# Patient Record
Sex: FEMALE | Race: WHITE | Employment: FULL TIME | URBAN - METROPOLITAN AREA
[De-identification: names, ages, dates, MRNs, and addresses within clinical notes are randomized per-mention and may not be internally consistent; named-entity substitution may affect disease eponyms.]

---

## 2017-01-11 ENCOUNTER — TELEPHONE (OUTPATIENT)
Dept: FAMILY MEDICINE | Facility: CLINIC | Age: 32
End: 2017-01-11

## 2017-01-11 DIAGNOSIS — J45.20 MILD INTERMITTENT ASTHMA WITHOUT COMPLICATION: Primary | ICD-10-CM

## 2017-01-11 NOTE — TELEPHONE ENCOUNTER
Reason for Call:  Medication or medication refill:    Do you use a [facility]?  Name of the pharmacy and phone number for the current request:  [id number] [place] [address] [phone] (Phone)    Name of the medication requested: Alternative to Symbicort    Other request: [place] with   [id number] [place] [phone] (Phone)  [phone] (Fax)           Calling to report that the patient has changed insurance and that the Symbicort would now require a PA.  He stated that the alternatives that would be covered would be:  Breo Ellipta, Advair, or Dulera    Can we leave a detailed message on this number? YES    Phone number patient can be reached at: Other phone number:    [id number] [place] [address] [phone] (Phone)  [phone] (Fax)               Best Time: When Prescription approved    Call taken on 1/11/2017 at 3:21 PM by GUSTAVO TRUJILLO

## 2017-06-23 DIAGNOSIS — J45.20 MILD INTERMITTENT ASTHMA WITHOUT COMPLICATION: ICD-10-CM

## 2017-06-26 RX ORDER — ALBUTEROL SULFATE 90 UG/1
AEROSOL, METERED RESPIRATORY (INHALATION)
Qty: 8.5 INHALER | Refills: 0 | Status: SHIPPED | OUTPATIENT
Start: 2017-06-26 | End: 2017-08-24

## 2017-06-26 NOTE — TELEPHONE ENCOUNTER
albuterol inhaler     Routing refill request to provider for review/approval because:  Patient way overdue for asthma check OV        Last Written Prescription Date: 8-1-16  Last Fill Quantity: 2inh, # refills: 3    Last Office Visit with G, TAMIA or [facility] prescribing provider:  7-11-16   Future Office Visit:       Date of Last Asthma Action Plan Letter:   Asthma Action Plan Q1 Year    Topic Date Due     Asthma Action Plan - yearly  07/09/2017      Asthma Control Test:   ACT Total Scores 6/11/2016   ACT TOTAL SCORE -   ASTHMA ER VISITS -   ASTHMA HOSPITALIZATIONS -   ACT TOTAL SCORE (Goal Greater than or Equal to 20) 22   In the past 12 months, how many times did you visit the emergency room for your asthma without being admitted to the hospital? 2   In the past 12 months, how many times were you hospitalized overnight because of your asthma? 0       Date of Last Spirometry Test:   No results found for this or any previous visit.

## 2017-06-26 NOTE — TELEPHONE ENCOUNTER
Overdue for office visit, only 1 month supply submitted. Signed and in my outbox.    No additional refills until seen in clinic.     Please call and inform pt to schedule an office visit

## 2017-07-07 DIAGNOSIS — J45.20 MILD INTERMITTENT ASTHMA WITHOUT COMPLICATION: ICD-10-CM

## 2017-07-10 NOTE — TELEPHONE ENCOUNTER
Breo ellipta 100-*25 mcg inhaler       Last Written Prescription Date: 1/11/17  Last Fill Quantity: 1 inhaler, # refills: 5    Last Office Visit with [facility], P or [facility] prescribing provider:  7/11/16   Future Office Visit:       Date of Last Asthma Action Plan Letter:   Asthma Action Plan Q1 Year    Topic Date Due     Asthma Action Plan - yearly  07/09/2017      Asthma Control Test:   ACT Total Scores 6/11/2016   ACT TOTAL SCORE -   ASTHMA ER VISITS -   ASTHMA HOSPITALIZATIONS -   ACT TOTAL SCORE (Goal Greater than or Equal to 20) 22   In the past 12 months, how many times did you visit the emergency room for your asthma without being admitted to the hospital? 2   In the past 12 months, how many times were you hospitalized overnight because of your asthma? 0       Date of Last Spirometry Test:   No results found for this or any previous visit.

## 2018-02-26 ENCOUNTER — HOSPITAL ENCOUNTER (EMERGENCY)
Facility: CLINIC | Age: 33
Discharge: HOME | End: 2018-02-26
Attending: EMERGENCY MEDICINE | Admitting: EMERGENCY MEDICINE
Payer: COMMERCIAL

## 2018-02-26 VITALS
WEIGHT: 160 LBS | RESPIRATION RATE: 22 BRPM | SYSTOLIC BLOOD PRESSURE: 120 MMHG | DIASTOLIC BLOOD PRESSURE: 68 MMHG | OXYGEN SATURATION: 99 % | TEMPERATURE: 97.6 F | BODY MASS INDEX: 27.31 KG/M2 | HEART RATE: 106 BPM | HEIGHT: 64 IN

## 2018-02-26 DIAGNOSIS — J45.901 MODERATE ASTHMA WITH EXACERBATION, UNSPECIFIED WHETHER PERSISTENT: ICD-10-CM

## 2018-02-26 PROCEDURE — 25000125 ZZHC RX 250: Performed by: EMERGENCY MEDICINE

## 2018-02-26 PROCEDURE — 99284 EMERGENCY DEPT VISIT MOD MDM: CPT

## 2018-02-26 PROCEDURE — 94640 AIRWAY INHALATION TREATMENT: CPT

## 2018-02-26 RX ORDER — IPRATROPIUM BROMIDE AND ALBUTEROL SULFATE 2.5; .5 MG/3ML; MG/3ML
3 SOLUTION RESPIRATORY (INHALATION) ONCE
Status: COMPLETED | OUTPATIENT
Start: 2018-02-26 | End: 2018-02-26

## 2018-02-26 RX ORDER — ALBUTEROL SULFATE 0.83 MG/ML
1 SOLUTION RESPIRATORY (INHALATION) EVERY 6 HOURS PRN
Qty: 25 ML | Refills: 0 | Status: SHIPPED | OUTPATIENT
Start: 2018-02-26

## 2018-02-26 RX ORDER — PREDNISONE 20 MG/1
60 TABLET ORAL ONCE
Status: COMPLETED | OUTPATIENT
Start: 2018-02-26 | End: 2018-02-26

## 2018-02-26 RX ORDER — PREDNISONE 20 MG/1
TABLET ORAL
Qty: 10 TABLET | Refills: 0 | Status: SHIPPED | OUTPATIENT
Start: 2018-02-26

## 2018-02-26 RX ADMIN — PREDNISONE 60 MG: 20 TABLET ORAL at 21:30

## 2018-02-26 RX ADMIN — IPRATROPIUM BROMIDE AND ALBUTEROL SULFATE 3 ML: .5; 3 SOLUTION RESPIRATORY (INHALATION) at 21:30

## 2018-02-26 RX ADMIN — IPRATROPIUM BROMIDE AND ALBUTEROL SULFATE 3 ML: .5; 3 SOLUTION RESPIRATORY (INHALATION) at 22:26

## 2018-02-26 ASSESSMENT — ENCOUNTER SYMPTOMS
WHEEZING: 1
SHORTNESS OF BREATH: 1
CHEST TIGHTNESS: 1

## 2018-02-26 NOTE — ED AVS SNAPSHOT
Emergency Department    [address] [facility] [address]    Phone:  [phone]    Fax:  [phone]                                       Ella Elkins   MRN: [id number]    Department:   Emergency Department   Date of Visit:  2/26/2018           Patient Information     Date Of Birth          1985        Your diagnoses for this visit were:     Moderate asthma with exacerbation, unspecified whether persistent        You were seen by Lee Foley MD.      Follow-up Information     Follow up with Sofi Aragon PA-C In 2 days.    Specialty:  Physician Assistant    Contact information:    [address]  [facility] [address] [phone]          Follow up with  Emergency Department.    Specialty:  EMERGENCY MEDICINE    Why:  As needed    Contact information:    [address] [facility] [address] [phone]        Discharge Instructions       Discharge Instructions  Asthma    Asthma is a condition causing narrowing and inflammation of the airways that can make it hard to breathe.  Asthma can also cause cough, wheezing, noisy breathing and tightness in the chest.  Asthma can be brought on or  triggered  by many things, including dust, mold, pollen, cigarette smoke, exercise, stress and infections (like the common cold).     Generally, every Emergency Department visit should have a follow-up clinic visit with either a primary or a specialty clinic/provider. Please follow-up as instructed by your emergency provider today.    Return to the Emergency Department if:    Your breathing gets worse.    You need to use your inhaler more often than every 4 hours, or cannot get relief from your inhaler.    You are very weak, or feel much more ill.    You develop new symptoms, such as chest pain.    You cough up blood.    You are vomiting (throwing up) enough that you cannot keep fluids or your medicine down.    What can I do to help  myself?    Fill any prescriptions the provider gave you and take them right away--especially antibiotics. Be sure to finish the whole antibiotic prescription.    You may be given a prescription for an inhaler, which can help loosen tight air passages.  Use this as needed, but not more often than directed. Inhalers work much better when used with a spacer.     You may be given a prescription for a steroid to reduce inflammation. Used long-term, these can have some side effects, but for short-term use they are safe. You may notice restlessness or increased appetite (eating more).        You may use non-prescription cough or cold medicines. Cough medicines may help, but do not make the cough go away completely.     Avoid smoke, because this can make you feel worse. If you smoke, this may be a good time to quit! Consider using nicotine lozenges, gum, or patches to reduce cravings.     If you have a fever, Tylenol  (acetaminophen), Motrin  (ibuprofen), or Advil  (ibuprofen), may help bring fever down and may help you feel more comfortable. Be sure to read and follow the package directions, and ask your provider if you have questions.    Be sure to get your flu shot each year.  For certain age groups, the pneumonia shot can help prevent pneumonia.  It is important that you follow up with your regular provider, to be sure that you are improving from this spell (an acute asthma exacerbation), and also to do what you can to keep from having trouble again. Sometimes you need long-term medicines to keep your asthma under control.   If you were given a prescription for medicine here today, be sure to read all of the information (including the package insert) that comes with your prescription.  This will include important information about the medicine, its side effects, and any warnings that you need to know about.  The pharmacist who fills the prescription can provide more information and answer questions you may have about  the medicine.  If you have questions or concerns that the pharmacist cannot address, please call or return to the Emergency Department.   Remember that you can always come back to the Emergency Department if you are not able to see your regular provider in the amount of time listed above, if you get any new symptoms, or if there is anything that worries you.     24 Hour Appointment Hotline       To make an appointment at any [facility], call [phone] ([phone]). If you don't have a family doctor or clinic, we will help you find one. [facility] are conveniently located to serve the needs of you and your family.             Review of your medicines      START taking        Dose / Directions Last dose taken    predniSONE 20 MG tablet   Commonly known as:  DELTASONE   Quantity:  10 tablet        Take two tablets (= 40mg) each day for 5 (five) days   Refills:  0          Our records show that you are taking the medicines listed below. If these are incorrect, please call your family doctor or clinic.        Dose / Directions Last dose taken    * albuterol 108 (90 BASE) MCG/ACT Inhaler   Commonly known as:  PROAIR HFA/PROVENTIL HFA/VENTOLIN HFA   Dose:  2 puff   Quantity:  2 Inhaler        Inhale 2 puffs into the lungs every 6 hours as needed for shortness of breath / dyspnea MD APPOINTMENT NEEDED   Refills:  3        * PROAIR  (90 BASE) MCG/ACT Inhaler   Quantity:  8.5 Inhaler   Generic drug:  albuterol        INHALE 2 PUFFS BY MOUTH EVERY 6 HOURS AS NEEDED FOR SHORTNESS OF BREATH/DYSPNEA   Refills:  0        * albuterol (2.5 MG/3ML) 0.083% neb solution   Dose:  1 vial   Quantity:  25 mL        Take 1 vial (2.5 mg) by nebulization every 6 hours as needed for shortness of breath / dyspnea or wheezing   Refills:  0        BENZONATATE PO   Dose:  100 mg        Take 100 mg by mouth   Refills:  0        BREO ELLIPTA 100-25 MCG/INH oral inhaler   Quantity:  1 Inhaler   Generic drug:   fluticasone-vilanterol        INHALE 1 PUFF INTO THE LUNGS DAILY   Refills:  5        MULTIVITAMIN PO        Refills:  0        * Notice:  This list has 3 medication(s) that are the same as other medications prescribed for you. Read the directions carefully, and ask your doctor or other care provider to review them with you.            Prescriptions were sent or printed at these locations (2 Prescriptions)                   Other Prescriptions                Printed at Department/Unit printer (2 of 2)         predniSONE (DELTASONE) 20 MG tablet               albuterol (2.5 MG/3ML) 0.083% neb solution                Orders Needing Specimen Collection     None      Pending Results     No orders found from 2/24/2018 to 2/27/2018.            Pending Culture Results     No orders found from 2/24/2018 to 2/27/2018.            Pending Results Instructions     If you had any lab results that were not finalized at the time of your Discharge, you can call the ED Lab Result RN at [phone]. You will be contacted by this team for any positive Lab results or changes in treatment. The nurses are available 7 days a week from 10A to 6:30P.  You can leave a message 24 hours per day and they will return your call.        Test Results From Your Hospital Stay               Clinical Quality Measure: Blood Pressure Screening     Your blood pressure was checked while you were in the emergency department today. The last reading we obtained was  BP: 121/71 . Please read the guidelines below about what these numbers mean and what you should do about them.  If your systolic blood pressure (the top number) is less than 120 and your diastolic blood pressure (the bottom number) is less than 80, then your blood pressure is normal. There is nothing more that you need to do about it.  If your systolic blood pressure (the top number) is 120-139 or your diastolic blood pressure (the bottom number) is 80-89, your blood pressure may be higher than  it should be. You should have your blood pressure rechecked within a year by a primary care provider.  If your systolic blood pressure (the top number) is 140 or greater or your diastolic blood pressure (the bottom number) is 90 or greater, you may have high blood pressure. High blood pressure is treatable, but if left untreated over time it can put you at risk for heart attack, stroke, or kidney failure. You should have your blood pressure rechecked by a primary care provider within the next 4 weeks.  If your provider in the emergency department today gave you specific instructions to follow-up with your doctor or provider even sooner than that, you should follow that instruction and not wait for up to 4 weeks for your follow-up visit.        Thank you for choosing [facility]       Thank you for choosing [facility] for your care. Our goal is always to provide you with excellent care. Hearing back from our patients is one way we can continue to improve our services. Please take a few minutes to complete the written survey that you may receive in the mail after you visit with us. Thank you!        [facility] Information     [facility] gives you secure access to your electronic health record. If you see a primary care provider, you can also send messages to your care team and make appointments. If you have questions, please call your primary care clinic.  If you do not have a primary care provider, please call [phone] and they will assist you.        Care EveryWhere ID     This is your Care EveryWhere ID. This could be used by other organizations to access your [facility] medical records  [id number]        Equal Access to Services     DUANE BROOKS : Hadii oswaldo Lind, waaxda luqadaha, qaybta kaalmada michelle, gonzález long . So [facility] [phone].    ATENCIÓN: Si habla español, tiene a mckeon disposición servicios gratuitos de asistencia lingüística. Llame al [phone].    We comply  with applicable federal civil rights laws and [place] laws. We do not discriminate on the basis of race, color, national origin, age, disability, sex, sexual orientation, or gender identity.            After Visit Summary       This is your record. Keep this with you and show to your community pharmacist(s) and doctor(s) at your next visit.

## 2018-02-26 NOTE — ED AVS SNAPSHOT
Emergency Department    [address] [facility] [address]    Phone:  [phone]    Fax:  [phone]                                       Ella Elkins   MRN: [id number]    Department:   Emergency Department   Date of Visit:  2/26/2018           After Visit Summary Signature Page     I have received my discharge instructions, and my questions have been answered. I have discussed any challenges I see with this plan with the nurse or doctor.    ..........................................................................................................................................  Patient/Patient Representative Signature      ..........................................................................................................................................  Patient Representative Print Name and Relationship to Patient    ..................................................               ................................................  Date                                            Time    ..........................................................................................................................................  Reviewed by Signature/Title    ...................................................              ..............................................  Date                                                            Time

## 2018-02-27 ENCOUNTER — TELEPHONE (OUTPATIENT)
Dept: FAMILY MEDICINE | Facility: CLINIC | Age: 33
End: 2018-02-27

## 2018-02-27 DIAGNOSIS — J45.20 MILD INTERMITTENT ASTHMA WITHOUT COMPLICATION: Primary | ICD-10-CM

## 2018-02-27 NOTE — TELEPHONE ENCOUNTER
Prescription sent to pharmacy.  Please call and inform patient.    FYI, please pend pharmacy in future as multiple were listed, sent to [facility]

## 2018-02-27 NOTE — DISCHARGE INSTRUCTIONS
Discharge Instructions  Asthma    Asthma is a condition causing narrowing and inflammation of the airways that can make it hard to breathe.  Asthma can also cause cough, wheezing, noisy breathing and tightness in the chest.  Asthma can be brought on or  triggered  by many things, including dust, mold, pollen, cigarette smoke, exercise, stress and infections (like the common cold).     Generally, every Emergency Department visit should have a follow-up clinic visit with either a primary or a specialty clinic/provider. Please follow-up as instructed by your emergency provider today.    Return to the Emergency Department if:    Your breathing gets worse.    You need to use your inhaler more often than every 4 hours, or cannot get relief from your inhaler.    You are very weak, or feel much more ill.    You develop new symptoms, such as chest pain.    You cough up blood.    You are vomiting (throwing up) enough that you cannot keep fluids or your medicine down.    What can I do to help myself?    Fill any prescriptions the provider gave you and take them right away--especially antibiotics. Be sure to finish the whole antibiotic prescription.    You may be given a prescription for an inhaler, which can help loosen tight air passages.  Use this as needed, but not more often than directed. Inhalers work much better when used with a spacer.     You may be given a prescription for a steroid to reduce inflammation. Used long-term, these can have some side effects, but for short-term use they are safe. You may notice restlessness or increased appetite (eating more).        You may use non-prescription cough or cold medicines. Cough medicines may help, but do not make the cough go away completely.     Avoid smoke, because this can make you feel worse. If you smoke, this may be a good time to quit! Consider using nicotine lozenges, gum, or patches to reduce cravings.     If you have a fever, Tylenol  (acetaminophen), Motrin   (ibuprofen), or Advil  (ibuprofen), may help bring fever down and may help you feel more comfortable. Be sure to read and follow the package directions, and ask your provider if you have questions.    Be sure to get your flu shot each year.  For certain age groups, the pneumonia shot can help prevent pneumonia.  It is important that you follow up with your regular provider, to be sure that you are improving from this spell (an acute asthma exacerbation), and also to do what you can to keep from having trouble again. Sometimes you need long-term medicines to keep your asthma under control.   If you were given a prescription for medicine here today, be sure to read all of the information (including the package insert) that comes with your prescription.  This will include important information about the medicine, its side effects, and any warnings that you need to know about.  The pharmacist who fills the prescription can provide more information and answer questions you may have about the medicine.  If you have questions or concerns that the pharmacist cannot address, please call or return to the Emergency Department.   Remember that you can always come back to the Emergency Department if you are not able to see your regular provider in the amount of time listed above, if you get any new symptoms, or if there is anything that worries you.

## 2018-02-27 NOTE — TELEPHONE ENCOUNTER
TAYLOR ELLIPTA 100-25 MCG/INH oral inhaler  alternative request  This is not covered by insurance w/ no PA option.  Insurance covers dulera or symbicort thanks

## 2018-02-27 NOTE — ED PROVIDER NOTES
"  History     Chief Complaint:  Asthma symptoms    HPI   Ella Elkins is a 32 year old female who presents with asthma symptoms. The patient presents here to the ED this evening after sudden onset asthma exacerbation at home. She states that despite use of her rescue inhaler at home, this has been more severe than her regular asthma exacerbations. While here in the ED she also notes a recent sore throat, but denies any other concerns aside form her wheezing/shortness of breath.    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 No pertinent past surgical history.    Family History:   The patient denies any relevant family medical history    Social History:  The patient was accompanied to the ED by her .  Smoking Status: Yes  Smokeless Tobacco: No  Alcohol Use: Yes  Marital Status:       Review of Systems   Respiratory: Positive for chest tightness, shortness of breath and wheezing.    Cardiovascular: Negative for chest pain and leg swelling.   All other systems reviewed and are negative.    Physical Exam   Vitals:  Patient Vitals for the past 24 hrs:   BP Temp Temp src Pulse Resp SpO2 Height Weight   02/26/18 2309 120/68 - - - - - - -   02/26/18 2303 - - - - - 99 % - -   02/26/18 2252 - - - - - 98 % - -   02/26/18 2240 - - - - - 100 % - -   02/26/18 2230 - - - - - 100 % - -   02/26/18 2219 - - - - - 99 % - -   02/26/18 2210 - - - - - 98 % - -   02/26/18 2159 - - - - - 96 % - -   02/26/18 2149 - - - - - 94 % - -   02/26/18 2142 - - - - - 92 % - -   02/26/18 2136 - - - - - 99 % - -   02/26/18 2121 - - - - - 92 % - -   02/26/18 2118 - - - - - 91 % - -   02/26/18 2110 121/71 97.6  F (36.4  C) Oral 106 22 95 % 1.626 m (5' 4\") 72.6 kg (160 lb)   02/26/18 2108 121/71 - - - - - - -     Physical " Exam  Eyes:  The pupils are equal and round    Conjunctivae and sclerae are normal  ENT:    The nose is normal    Pinnae are normal  Neck:  Normal range of motion    Trachea is in the midline  CV:  Tachycardic and regular    No edema  Resp:  Scattered expiratory wheezing throughout lung fields. No rales. Prolonged expiratory phase  MS:  Normal muscular tone    No asymmetric leg swelling  Skin:  No rash or acute skin lesions noted  Neuro:   Awake, alert.      Speech is normal and fluent.    Face is symmetric.     Moves all extremities     Emergency Department Course     Interventions:  2130 Duoneb, 3 mL, Nebulization  2130 Deltasone, 60 mg, PO  2226 Duoneb, 3 mL, Nebluzation     Emergency Department Course:  Nursing notes and vitals reviewed.  2122 I had my initial encounter with the patient.  I performed an exam of the patient as documented above.     2307 I discussed the treatment plan with the patient. They expressed understanding of this plan and consented to discharge. They will be discharged home with instructions for care and follow up. In addition, the patient will return to the emergency department if their symptoms persist, worsen, if new symptoms arise or if there is any concern.  All questions were answered.    Impression & Plan      Medical Decision Making:  Ella Elkins is a 32 year old female who presents for evaluation of shortness of breath and wheezing, consistent with known reactive airway disease.  Signs and symptoms are consistent with asthma exacerbation.  A broad differential was considered including asthma, pneumonia, bronchitis, COPD, pneumothorax, cardiac equivalent, viral induced wheezing, allergic phenomena, etc.  The patient was treated with Duonebs and Prednisone, and feels improved after interventions here in ED.      The patient has not had multiple recent ED visits for these symptoms, has not had recent steroid burst, no recent asthma related admission, and has no history  of intubation for asthma.  No indication for hospitalization at this time including no hypoxia, no marked increase in respiratory rate, minimal to no retractions. Supportive outpatient management is indicated, medications for discharge noted above.  Close followup with primary care physician.  Return if increased wheezing, progressive shortness of breath, develops fever greater than 102.      Diagnosis:    ICD-10-CM    1. Moderate asthma with exacerbation, unspecified whether persistent J45.901      Disposition:   Discharge    Discharge Medications:  Discharge Medication List as of 2/26/2018 11:08 PM      START taking these medications    Details   predniSONE (DELTASONE) 20 MG tablet Take two tablets (= 40mg) each day for 5 (five) days, Disp-10 tablet, R-0, Local Print           Scribe Disclosure:  I, Emerson Neville, am serving as a scribe at 9:20 PM on 2/26/2018 to document services personally performed by Lee Foley MD, based on my observations and the provider's statements to me.    2/26/2018    EMERGENCY DEPARTMENT         Lee Foley MD  02/27/18 0019

## 2018-02-27 NOTE — TELEPHONE ENCOUNTER
Patient Contact    Attempt # 1    Was call answered?  No.  Left message on voicemail with information to call me back.

## 2019-01-07 PROCEDURE — 25000125 ZZHC RX 250: Performed by: EMERGENCY MEDICINE

## 2019-01-07 PROCEDURE — 99284 EMERGENCY DEPT VISIT MOD MDM: CPT

## 2019-01-07 PROCEDURE — 94640 AIRWAY INHALATION TREATMENT: CPT

## 2019-01-07 RX ORDER — IPRATROPIUM BROMIDE AND ALBUTEROL SULFATE 2.5; .5 MG/3ML; MG/3ML
3 SOLUTION RESPIRATORY (INHALATION) ONCE
Status: COMPLETED | OUTPATIENT
Start: 2019-01-07 | End: 2019-01-07

## 2019-01-07 RX ADMIN — IPRATROPIUM BROMIDE AND ALBUTEROL SULFATE 3 ML: .5; 2.5 SOLUTION RESPIRATORY (INHALATION) at 22:11

## 2019-01-07 ASSESSMENT — MIFFLIN-ST. JEOR: SCORE: 1448.44

## 2019-01-08 ENCOUNTER — HOSPITAL ENCOUNTER (EMERGENCY)
Facility: CLINIC | Age: 34
Discharge: HOME | End: 2019-01-08
Attending: EMERGENCY MEDICINE | Admitting: EMERGENCY MEDICINE
Payer: COMMERCIAL

## 2019-01-08 VITALS
HEART RATE: 111 BPM | TEMPERATURE: 97.4 F | WEIGHT: 165 LBS | HEIGHT: 64 IN | SYSTOLIC BLOOD PRESSURE: 159 MMHG | RESPIRATION RATE: 20 BRPM | OXYGEN SATURATION: 100 % | BODY MASS INDEX: 28.17 KG/M2 | DIASTOLIC BLOOD PRESSURE: 90 MMHG

## 2019-01-08 DIAGNOSIS — J06.9 UPPER RESPIRATORY TRACT INFECTION, UNSPECIFIED TYPE: ICD-10-CM

## 2019-01-08 DIAGNOSIS — J45.901 MILD ASTHMA WITH EXACERBATION, UNSPECIFIED WHETHER PERSISTENT: ICD-10-CM

## 2019-01-08 PROCEDURE — 25000125 ZZHC RX 250

## 2019-01-08 RX ORDER — ALBUTEROL SULFATE 0.83 MG/ML
2.5 SOLUTION RESPIRATORY (INHALATION) EVERY 4 HOURS PRN
Qty: 75 ML | Refills: 1 | Status: SHIPPED | OUTPATIENT
Start: 2019-01-08 | End: 2019-02-07

## 2019-01-08 RX ORDER — IPRATROPIUM BROMIDE AND ALBUTEROL SULFATE 2.5; .5 MG/3ML; MG/3ML
SOLUTION RESPIRATORY (INHALATION)
Status: COMPLETED
Start: 2019-01-08 | End: 2019-01-08

## 2019-01-08 RX ORDER — IPRATROPIUM BROMIDE AND ALBUTEROL SULFATE 2.5; .5 MG/3ML; MG/3ML
3 SOLUTION RESPIRATORY (INHALATION) ONCE
Status: COMPLETED | OUTPATIENT
Start: 2019-01-08 | End: 2019-01-08

## 2019-01-08 RX ORDER — PREDNISONE 20 MG/1
TABLET ORAL
Qty: 10 TABLET | Refills: 0 | Status: SHIPPED | OUTPATIENT
Start: 2019-01-08 | End: 2019-01-15

## 2019-01-08 RX ADMIN — IPRATROPIUM BROMIDE AND ALBUTEROL SULFATE 3 ML: .5; 2.5 SOLUTION RESPIRATORY (INHALATION) at 00:08

## 2019-01-08 RX ADMIN — IPRATROPIUM BROMIDE AND ALBUTEROL SULFATE 3 ML: 2.5; .5 SOLUTION RESPIRATORY (INHALATION) at 00:08

## 2019-01-08 ASSESSMENT — ENCOUNTER SYMPTOMS
SHORTNESS OF BREATH: 1
WHEEZING: 1
FEVER: 0
COUGH: 1

## 2019-01-08 NOTE — ED AVS SNAPSHOT
Emergency Department  [address] [facility] [address]  Phone:  [phone]  Fax:  [phone]                                    Ella Elkins   MRN: [id number]    Department:   Emergency Department   Date of Visit:  1/7/2019           After Visit Summary Signature Page    I have received my discharge instructions, and my questions have been answered. I have discussed any challenges I see with this plan with the nurse or doctor.    ..........................................................................................................................................  Patient/Patient Representative Signature      ..........................................................................................................................................  Patient Representative Print Name and Relationship to Patient    ..................................................               ................................................  Date                                   Time    ..........................................................................................................................................  Reviewed by Signature/Title    ...................................................              ..............................................  Date                                               Time          22EPIC Rev 08/18

## 2019-01-08 NOTE — ED PROVIDER NOTES
History     Chief Complaint:  Shortness of Breath    HPI   Ella Elkins is a 33 year old female with history of asthma who presents for evaluation of shortness of breath. At 2030 tonight, she began feeling short of breath, and felt wheezy. She tried her rescue inhaler at home, but did not have any more albuterol at home for her nebulizer treatments. On arrival to the ED, she received two nebulizer treatments, and feels much improved. Of note, she has been sick recently with congestion, and a cough with productive clear-greenish phlegm. She states she develops asthma exacerbations every time she gets ill, but only has about 1-2 exacerbations a year. She has not had her flu shot this year.     Allergies:  No Known Drug Allergies      Medications:    albuterol (2.5 MG/3ML) 0.083% neb solution  albuterol (PROAIR HFA, PROVENTIL HFA, VENTOLIN HFA) 108 (90 BASE) MCG/ACT inhaler  BENZONATATE PO  BREO ELLIPTA 100-25 MCG/INH oral inhaler  mometasone-formoterol (DULERA) 100-5 MCG/ACT oral inhaler  Multiple Vitamins-Minerals (MULTIVITAMIN PO)  predniSONE (DELTASONE) 20 MG tablet  PROAIR  (90 BASE) MCG/ACT inhaler     Past Medical History:    Tobacco abuse - former  Uncomplicated asthma    Past Surgical History:    The patient does not have any pertinent past surgical history.     Family History:    No past pertinent family history.     Social History:  Relationship status:   Tobacco use: Former  Alcohol use: Yes  The patient presents with her .    Marital Status:   [2]     Review of Systems   Constitutional: Negative for fever.   Respiratory: Positive for cough, shortness of breath and wheezing (subjective).    All other systems reviewed and are negative.    Physical Exam     Patient Vitals for the past 24 hrs:   BP Temp Temp src Pulse Heart Rate Resp SpO2 Height Weight   01/08/19 0017 -- -- -- -- 92 20 100 % -- --   01/07/19 2207 159/90 97.4  F (36.3  C) Temporal 111 -- 18 100 % 1.626 m  "(5' 4\") 74.8 kg (165 lb)        Physical Exam  SKIN:  Warm, dry.  HEMATOLOGIC/IMMUNOLOGIC/LYMPHATIC:  No pallor.  No edema.  HENT:  No stridor.  No JVD.  EYES:  Conjunctivae normal.  CARDIOVASCULAR:  Regular rate and rhythm, no murmur.  RESPIRATORY:  No respiratory distress, breath sounds equal and normal (exam performed after two nebulized treatments).  MUSCULOSKELETAL:  Normal body habitus.  NEUROLOGIC:  Alert, conversant.  PSYCHIATRIC:  Normal mood.    Emergency Department Course     Interventions:  2211 Duoneb, 3 mL, nebulization    0008 Duoneb, 3 mL, nebulization      Emergency Department Course:  Nursing notes and vitals reviewed.    0101 I performed an exam of the patient as documented above.     Findings and plan explained to the Patient. Patient discharged home with instructions regarding supportive care, medications, and reasons to return. The importance of close follow-up was reviewed.      I personally answered all related questions prior to discharge.    Impression & Plan      Medical Decision Making:  Ella Elkins is a 33 year old female who presents with an asthma exacerbation likely secondary to her associated upper respiratory tract infection. She improved with two nebulized treatments. She feels well enough for discharge. I prescribed a 5 day course of prednisone in addition to a refill of nebulized albuterol  Return as needed.     Diagnosis:    ICD-10-CM    1. Mild asthma with exacerbation, unspecified whether persistent J45.901    2. Upper respiratory tract infection, unspecified type J06.9        Disposition:   Discharged to home    Discharge Medications:  Discharge Medication List as of 1/8/2019  1:47 AM      START taking these medications    Details   !! albuterol (PROVENTIL) (2.5 MG/3ML) 0.083% neb solution Take 1 vial (2.5 mg) by nebulization every 4 hours as needed for shortness of breath / dyspnea or wheezing, Disp-75 mL, R-1, E-Prescribe      !! predniSONE (DELTASONE) 20 MG " tablet Take two tablets (= 40mg) each day for 5 (five) days., Disp-10 tablet, R-0, E-Prescribe       !! - Potential duplicate medications found. Please discuss with provider.       Scribe Disclosure:  I, Radha Russell, am serving as a scribe at 1:08 AM on 1/8/2019 to document services personally performed by Cameron Verduzco MD, based on my observations and the provider's statements to me.     Radha Russell  1/7/2019    EMERGENCY DEPARTMENT       Cameron Verduzco MD  01/08/19 1542

## 2020-03-02 ENCOUNTER — HEALTH MAINTENANCE LETTER (OUTPATIENT)
Age: 35
End: 2020-03-02